# Patient Record
Sex: MALE | Race: WHITE | NOT HISPANIC OR LATINO | Employment: OTHER | URBAN - METROPOLITAN AREA
[De-identification: names, ages, dates, MRNs, and addresses within clinical notes are randomized per-mention and may not be internally consistent; named-entity substitution may affect disease eponyms.]

---

## 2022-07-06 PROBLEM — E03.9 HYPOTHYROIDISM (ACQUIRED): Status: ACTIVE | Noted: 2022-07-06

## 2022-07-06 PROBLEM — I10 ESSENTIAL HYPERTENSION: Status: ACTIVE | Noted: 2022-07-06

## 2022-07-06 PROBLEM — I48.91 A-FIB: Status: ACTIVE | Noted: 2022-07-06

## 2022-07-06 PROBLEM — G47.30 SLEEP APNEA: Status: ACTIVE | Noted: 2022-07-06

## 2022-07-06 NOTE — PROGRESS NOTES
OFFICE VISIT  NOTE  [facility] CARDIOLOGY      Name: Darrell Lopez    Date: 2022  MRN:  [id number]  :  1950      REFERRING/PRIMARY PROVIDER:  Rachel Duarte MD    Chief Complaint   Patient presents with   • Atrial Fibrillation       HPI: Darrell Lopez is a 71 y.o. male who presents today for new consultation for A.Fib.  Diagnosed with A. fib approximately 15 years ago in [place], recently moved to [place] to be closer to grandchildren.  Tried 2-3 antiarrhythmics prior but none could control his A. fib therefore rate control pursued.  He is on diltiazem and Xarelto.  No bleeding complications.  History of hypertension and hypothyroidism.  Exercises 2-3 days/week by walking on treadmill, no chest pain or shortness of breath.  Last echo over 5 to 10 years ago, last stress test many years ago.    Past Medical History:   Diagnosis Date   • Abnormal heart rhythm    • Acid reflux    • Easy bruising    • Gout    • Thyroid disorder        Past Surgical History:   Procedure Laterality Date   • FOOT SURGERY Left    • KNEE CARTILAGE SURGERY     • TENDON REPAIR         Social History     Socioeconomic History   • Marital status: Unknown   Tobacco Use   • Smoking status: Never Smoker   • Smokeless tobacco: Never Used   Vaping Use   • Vaping Use: Never used   Substance and Sexual Activity   • Alcohol use: Yes     Comment: 1-2 week   • Drug use: Never   • Sexual activity: Defer       Family History   Problem Relation Age of Onset   • Heart attack Mother    • Stroke Father    • No Known Problems Sister    • No Known Problems Sister    • No Known Problems Brother    • No Known Problems Brother         ROS:   Constitutional no fever,  no weight loss   Skin no rash, no subcutaneous nodules   Otolaryngeal no difficulty swallowing   Cardiovascular See HPI   Pulmonary no cough, no sputum production   Gastrointestinal no constipation, no diarrhea   Genitourinary no dysuria, no hematuria   Hematologic no  "easy bruisability, no abnormal bleeding   Musculoskeletal no muscle pain   Neurologic no dizziness, no falls         No Known Allergies      Current Outpatient Medications:   •  allopurinol (ZYLOPRIM) 300 MG tablet, Take 1 tablet by mouth Daily., Disp: , Rfl:   •  dilTIAZem (TIAZAC) 240 MG 24 hr capsule, Take 1 capsule by mouth Daily., Disp: , Rfl:   •  famotidine (PEPCID) 40 MG tablet, Take 1 tablet by mouth Daily., Disp: , Rfl:   •  ibandronate (BONIVA) 150 MG tablet, Take 1 tablet by mouth Every 30 (Thirty) Days., Disp: , Rfl:   •  levothyroxine sodium (TIROSINT) 50 MCG capsule, Take 1 capsule by mouth Daily., Disp: , Rfl:   •  rivaroxaban (XARELTO) 20 MG tablet, Take 1 tablet by mouth Daily., Disp: , Rfl:     Vitals:    07/07/22 1038   BP: 132/82   BP Location: Right arm   Patient Position: Sitting   Pulse: 66   SpO2: 98%   Weight: 92.5 kg (204 lb)   Height: 180.3 cm (71\")     Body mass index is 28.45 kg/m².    PHYSICAL EXAM:    General Appearance:   · well developed  · well nourished  HENT:   · oropharynx moist  · lips not cyanotic  Neck:  · thyroid not enlarged  · supple  Respiratory:  · no respiratory distress  · normal breath sounds  · no rales  Cardiovascular:  · no jugular venous distention  · regular rhythm  · apical impulse normal  · S1 normal, S2 normal  · no S3, no S4   · no murmur  · no rub, no thrill  · carotid pulses normal; no bruit  · lower extremity edema: none      Musculoskeletal:  · no clubbing of fingers.   · normocephalic, head atraumatic  Skin:   · warm, dry  Psychiatric:  · judgement and insight appropriate  · normal mood and affect    RESULTS:     ECG 12 Lead    Date/Time: 7/7/2022 11:05 AM  Performed by: Ilya Seth MD  Authorized by: Ilya Seth MD   Comparison: not compared with previous ECG   Previous ECG: no previous ECG available  Rhythm: atrial fibrillation  Rate: normal  BPM: 61  QRS axis: normal    Clinical impression: non-specific ECG                  Labs:  CBC " 5/23/22   RBC 4.77   WBC 6.6   Hemoglobin 14.8   Hematocrit 42.8   Platelet 209       CMP 5/23/22   Glucose 95   BUN 12   Creatinine 0.95   Glomerular Filtration Rate >60   BUN/Creatinine Ratio    Sodium 142   Potassium 4.5   Chloride 104   Carbon Dioxide 30   Calcium 10.2   AST 25   ALT 19       LIPID PANEL 5/23/22   Total 164   HDL 49.7   Triglycerides 75   LDL 99       PSA 2.55   TSH 1.36       Most recent PCP note, imaging tests, and labs reviewed.  Personally reviewed his most recent PCP note, and labs including thyroid function, lipids, and CMP.  Also personally viewed and interpreted his previous EKG which showed controlled rate A. fib no ischemic changes.    ASSESSMENT:  Problem List Items Addressed This Visit        Cardiac and Vasculature    A-fib (HCC) - Primary    Overview     Awaiting records from Dr. Greg Garland           Relevant Medications    dilTIAZem (TIAZAC) 240 MG 24 hr capsule    Essential hypertension    Relevant Medications    dilTIAZem (TIAZAC) 240 MG 24 hr capsule       Endocrine and Metabolic    Hypothyroidism (acquired)    Relevant Medications    levothyroxine sodium (TIROSINT) 50 MCG capsule       Sleep    Sleep apnea          PLAN:    1.  Permanent atrial fibrillation:  Continue rate control strategy with diltiazem  Continue stroke prevention with Xarelto at current dose  Continue exercise, increase 250 minutes/week.    2.  Dyspnea on exertion:  Check echocardiogram    3.  Hyperlipidemia:  Goal LDL less than 100, continue diet control for now.    4.  Hypertension:  Long-term goal blood pressure less than 130/80  Continue diltiazem at current dose    Advance Care Planning   ACP discussion was held with the patient during this visit. Patient does not have an advance directive, information provided.         Return to clinic in 9 months, or sooner as needed.    Thank you for the opportunity to share in the care of your patient; please do not hesitate to call me with any questions.      Ilya Seth MD, [facility]  Office: [phone]  [place] [address]    07/07/22

## 2022-07-07 ENCOUNTER — OFFICE VISIT (OUTPATIENT)
Dept: CARDIOLOGY | Facility: CLINIC | Age: 72
End: 2022-07-07

## 2022-07-07 VITALS
DIASTOLIC BLOOD PRESSURE: 82 MMHG | HEIGHT: 71 IN | BODY MASS INDEX: 28.56 KG/M2 | WEIGHT: 204 LBS | HEART RATE: 66 BPM | OXYGEN SATURATION: 98 % | SYSTOLIC BLOOD PRESSURE: 132 MMHG

## 2022-07-07 DIAGNOSIS — E03.9 HYPOTHYROIDISM (ACQUIRED): ICD-10-CM

## 2022-07-07 DIAGNOSIS — I10 ESSENTIAL HYPERTENSION: ICD-10-CM

## 2022-07-07 DIAGNOSIS — G47.30 SLEEP APNEA, UNSPECIFIED TYPE: ICD-10-CM

## 2022-07-07 DIAGNOSIS — I48.0 PAROXYSMAL ATRIAL FIBRILLATION: Primary | ICD-10-CM

## 2022-07-07 PROCEDURE — 99204 OFFICE O/P NEW MOD 45 MIN: CPT | Performed by: INTERNAL MEDICINE

## 2022-07-07 PROCEDURE — 93000 ELECTROCARDIOGRAM COMPLETE: CPT | Performed by: INTERNAL MEDICINE

## 2022-07-07 RX ORDER — DILTIAZEM HYDROCHLORIDE 240 MG/1
1 CAPSULE, EXTENDED RELEASE ORAL DAILY
COMMUNITY

## 2022-07-07 RX ORDER — ALLOPURINOL 300 MG/1
1 TABLET ORAL DAILY
COMMUNITY

## 2022-07-07 RX ORDER — IBANDRONATE SODIUM 150 MG/1
1 TABLET, FILM COATED ORAL
COMMUNITY

## 2022-07-07 RX ORDER — FAMOTIDINE 40 MG/1
1 TABLET, FILM COATED ORAL DAILY
COMMUNITY

## 2022-07-07 RX ORDER — LEVOTHYROXINE SODIUM 50 UG/1
1 CAPSULE ORAL DAILY
COMMUNITY

## 2022-07-18 ENCOUNTER — HOSPITAL ENCOUNTER (OUTPATIENT)
Dept: CARDIOLOGY | Facility: HOSPITAL | Age: 72
Setting detail: HOSPITAL OUTPATIENT SURGERY
Discharge: HOME | End: 2022-07-18
Admitting: INTERNAL MEDICINE

## 2022-07-18 VITALS — BODY MASS INDEX: 28.4 KG/M2 | HEIGHT: 71 IN | WEIGHT: 202.82 LBS

## 2022-07-18 DIAGNOSIS — I48.0 PAROXYSMAL ATRIAL FIBRILLATION: ICD-10-CM

## 2022-07-18 LAB
ASCENDING AORTA: 3.3 CM
BH CV ECHO MEAS - AO MAX PG: 4.8 MMHG
BH CV ECHO MEAS - AO MEAN PG: 3 MMHG
BH CV ECHO MEAS - AO ROOT DIAM: 3.1 CM
BH CV ECHO MEAS - AO V2 MAX: 109 CM/SEC
BH CV ECHO MEAS - AO V2 VTI: 22 CM
BH CV ECHO MEAS - AVA(I,D): 3.7 CM2
BH CV ECHO MEAS - EDV(CUBED): 220.3 ML
BH CV ECHO MEAS - EDV(MOD-SP2): 87 ML
BH CV ECHO MEAS - EDV(MOD-SP4): 73 ML
BH CV ECHO MEAS - EF(MOD-BP): 61 %
BH CV ECHO MEAS - EF(MOD-SP2): 66.7 %
BH CV ECHO MEAS - EF(MOD-SP4): 57.5 %
BH CV ECHO MEAS - ESV(CUBED): 65.9 ML
BH CV ECHO MEAS - ESV(MOD-SP2): 29 ML
BH CV ECHO MEAS - ESV(MOD-SP4): 31 ML
BH CV ECHO MEAS - FS: 33.1 %
BH CV ECHO MEAS - IVS/LVPW: 0.95 CM
BH CV ECHO MEAS - IVSD: 1.23 CM
BH CV ECHO MEAS - LA DIMENSION: 5.4 CM
BH CV ECHO MEAS - LAT PEAK E' VEL: 16.4 CM/SEC
BH CV ECHO MEAS - LV MASS(C)D: 340.9 GRAMS
BH CV ECHO MEAS - LV MAX PG: 4.4 MMHG
BH CV ECHO MEAS - LV MEAN PG: 2 MMHG
BH CV ECHO MEAS - LV V1 MAX: 105 CM/SEC
BH CV ECHO MEAS - LV V1 VTI: 21.6 CM
BH CV ECHO MEAS - LVIDD: 6 CM
BH CV ECHO MEAS - LVIDS: 4 CM
BH CV ECHO MEAS - LVOT AREA: 3.8 CM2
BH CV ECHO MEAS - LVOT DIAM: 2.2 CM
BH CV ECHO MEAS - LVPWD: 1.3 CM
BH CV ECHO MEAS - MED PEAK E' VEL: 8.8 CM/SEC
BH CV ECHO MEAS - MV DEC SLOPE: 439 CM/SEC2
BH CV ECHO MEAS - MV DEC TIME: 0.2 MSEC
BH CV ECHO MEAS - MV E MAX VEL: 72.6 CM/SEC
BH CV ECHO MEAS - MV P1/2T: 79.4 MSEC
BH CV ECHO MEAS - MVA(P1/2T): 2.8 CM2
BH CV ECHO MEAS - PA ACC SLOPE: 690 CM/SEC2
BH CV ECHO MEAS - PA ACC TIME: 0.12 SEC
BH CV ECHO MEAS - PA PR(ACCEL): 25 MMHG
BH CV ECHO MEAS - PI END-D VEL: 119 CM/SEC
BH CV ECHO MEAS - RAP SYSTOLE: 3 MMHG
BH CV ECHO MEAS - RVSP: 30 MMHG
BH CV ECHO MEAS - SV(LVOT): 82.1 ML
BH CV ECHO MEAS - SV(MOD-SP2): 58 ML
BH CV ECHO MEAS - SV(MOD-SP4): 42 ML
BH CV ECHO MEAS - TAPSE (>1.6): 2.5 CM
BH CV ECHO MEAS - TR MAX PG: 27.9 MMHG
BH CV ECHO MEAS - TR MAX VEL: 264 CM/SEC
BH CV ECHO MEASUREMENTS AVERAGE E/E' RATIO: 5.76
BH CV VAS BP RIGHT ARM: NORMAL MMHG
BH CV XLRA - RV BASE: 3 CM
BH CV XLRA - RV LENGTH: 6.3 CM
BH CV XLRA - RV MID: 3.1 CM
BH CV XLRA - TDI S': 8.9 CM/SEC
IVRT: 52 MSEC
LEFT ATRIUM VOLUME INDEX: 52.6 ML/M2
MAXIMAL PREDICTED HEART RATE: 147 BPM
STRESS TARGET HR: 125 BPM

## 2022-07-18 PROCEDURE — 93306 TTE W/DOPPLER COMPLETE: CPT

## 2022-07-18 PROCEDURE — 93306 TTE W/DOPPLER COMPLETE: CPT | Performed by: INTERNAL MEDICINE

## 2022-07-19 ENCOUNTER — TELEPHONE (OUTPATIENT)
Dept: CARDIOLOGY | Facility: CLINIC | Age: 72
End: 2022-07-19

## 2022-07-19 NOTE — TELEPHONE ENCOUNTER
----- Message from Ilya Seth MD sent at 7/19/2022  8:23 AM EDT -----  Please inform the patient of their test results. Thank you.

## 2023-03-31 NOTE — PROGRESS NOTES
OFFICE VISIT  NOTE  [facility] CARDIOLOGY MAIN CAMPUS      Name: Darrell Lopez    Date: 2023  MRN:  [id number]  :  1950      REFERRING/PRIMARY PROVIDER:  Rachel Duarte MD     Chief Complaint   Patient presents with   • Atrial Fibrillation   • Hypertension       HPI: Darrell Lopez is a 72 y.o. male who presents today follow up for A.Fib.  Diagnosed with A. fib approximately 15 years ago in [place], recently moved to [place] to be closer to grandchildren.  Tried 2-3 antiarrhythmics prior but none could control his A. fib therefore rate control pursued.  He is on diltiazem and Xarelto.  No bleeding complications.  History of hypertension and hypothyroidism.    Echo 2022 with normal EF, mild LVH, mild-mod MR, severe left atrial enlargement, normal RVSP.  Doing well, denies chest pain palpitations or significant shortness of breath, compliant with CPAP.  Working in his garden on the warmer days, not exercising currently.    ROS:Pertinent positives as listed in the HPI.  All other systems reviewed and negative.    Past Medical History:   Diagnosis Date   • Abnormal heart rhythm    • Acid reflux    • Easy bruising    • Gout    • Thyroid disorder        Past Surgical History:   Procedure Laterality Date   • FOOT SURGERY Left    • KNEE CARTILAGE SURGERY     • TENDON REPAIR         Social History     Socioeconomic History   • Marital status:    Tobacco Use   • Smoking status: Never   • Smokeless tobacco: Never   Vaping Use   • Vaping Use: Never used   Substance and Sexual Activity   • Alcohol use: Yes     Comment: 1-2 week   • Drug use: Never   • Sexual activity: Defer       Family History   Problem Relation Age of Onset   • Heart attack Mother    • Stroke Father    • No Known Problems Sister    • No Known Problems Sister    • No Known Problems Brother    • No Known Problems Brother         No Known Allergies    Current Outpatient Medications   Medication  "Instructions   • allopurinol (ZYLOPRIM) 300 MG tablet 1 tablet, Oral, Daily   • clobetasol (TEMOVATE) 0.05 % ointment No dose, route, or frequency recorded.   • dilTIAZem (TIAZAC) 240 MG 24 hr capsule 1 capsule, Oral, Daily   • famotidine (PEPCID) 40 MG tablet 1 tablet, Oral, Daily   • ibandronate (BONIVA) 150 MG tablet 1 tablet, Oral, Every 30 Days   • levothyroxine sodium (TIROSINT) 50 MCG capsule 1 capsule, Oral, Daily   • rivaroxaban (XARELTO) 20 MG tablet 1 tablet, Oral, Daily       Vitals:    04/13/23 1021   BP: 122/80   BP Location: Right arm   Patient Position: Sitting   Pulse: 55   SpO2: 99%   Weight: 90.3 kg (199 lb)   Height: 180 cm (70.87\")     Body mass index is 27.86 kg/m².    PHYSICAL EXAM:    General Appearance:   · well developed  · well nourished  Neck:  · thyroid not enlarged  · supple  Respiratory:  · no respiratory distress  · normal breath sounds  · no rales  Cardiovascular:  · no jugular venous distention  · regular rhythm  · apical impulse normal  · S1 normal, S2 normal  · no S3, no S4   · no murmur  · no rub, no thrill  · carotid pulses normal; no bruit  · pedal pulses normal  · lower extremity edema: none    Skin:   warm, dry    RESULTS:   Procedures    Results for orders placed during the hospital encounter of 07/18/22    Adult Transthoracic Echo Complete W/ Cont if Necessary Per Protocol    Interpretation Summary  · Left ventricular ejection fraction appears to be 61 - 65%. Left ventricular systolic function is normal.  · Left ventricular wall thickness is consistent with mild concentric hypertrophy.  · Left atrial volume is severely increased.  · The right atrial cavity is moderately dilated.  · Estimated right ventricular systolic pressure from tricuspid regurgitation is normal (<35 mmHg). Calculated right ventricular systolic pressure from tricuspid regurgitation is 30 mmHg.  · Left ventricular diastolic function was normal.  · Mild to moderate mitral regurgitation.        Labs:  No " results found for: CHOL, TRIG, HDL, LDL, AST, ALT  No results found for: HGBA1C  No results found for: CREATININE  No results found for: EGFRIFNONA      ASSESSMENT:  Problem List Items Addressed This Visit        Cardiac and Vasculature    A-fib - Primary    Overview     Awaiting records from Dr. Greg Garalnd         Essential hypertension       Sleep    Sleep apnea       PLAN:  1.  Permanent atrial fibrillation:  Continue rate control strategy with diltiazem  Continue stroke prevention with Xarelto at current dose, no current bleeding complications.    Resume aerobic exercise of 15 to 30 minutes/day     2.  Dyspnea on exertion:  Echo 7/2022 with normal EF, mild LVH, mild-mod MR  Symptoms largely resolved, advised him to increase his aerobic exercise     3.  Hyperlipidemia:  Goal LDL less than 100, continue diet control for now.  Labs monitored by PCP     4.  Hypertension:  Long-term goal blood pressure less than 130/80  Well-controlled on current regimen, continue, low-sodium diet and exercise         Advance Care Planning   ACP discussion was held with the patient during this visit. Patient does not have an advance directive, information provided.          Follow-up   Return in about 1 year (around 4/13/2024).  Ilya Seth MD, FACC, [facility]  Interventional Cardiology

## 2023-04-13 ENCOUNTER — OFFICE VISIT (OUTPATIENT)
Dept: CARDIOLOGY | Facility: CLINIC | Age: 73
End: 2023-04-13
Payer: MEDICARE

## 2023-04-13 VITALS
HEART RATE: 55 BPM | HEIGHT: 71 IN | WEIGHT: 199 LBS | OXYGEN SATURATION: 99 % | BODY MASS INDEX: 27.86 KG/M2 | DIASTOLIC BLOOD PRESSURE: 80 MMHG | SYSTOLIC BLOOD PRESSURE: 122 MMHG

## 2023-04-13 DIAGNOSIS — I48.11 LONGSTANDING PERSISTENT ATRIAL FIBRILLATION: Primary | ICD-10-CM

## 2023-04-13 DIAGNOSIS — G47.30 SLEEP APNEA, UNSPECIFIED TYPE: ICD-10-CM

## 2023-04-13 DIAGNOSIS — I10 ESSENTIAL HYPERTENSION: ICD-10-CM

## 2023-04-13 RX ORDER — CLOBETASOL PROPIONATE 0.5 MG/G
OINTMENT TOPICAL
COMMUNITY
Start: 2023-02-02

## 2023-05-24 ENCOUNTER — TRANSCRIBE ORDERS (OUTPATIENT)
Dept: ADMINISTRATIVE | Facility: HOSPITAL | Age: 73
End: 2023-05-24
Payer: MEDICARE

## 2023-05-24 ENCOUNTER — HOSPITAL ENCOUNTER (OUTPATIENT)
Dept: GENERAL RADIOLOGY | Facility: HOSPITAL | Age: 73
Discharge: HOME | End: 2023-05-24
Admitting: INTERNAL MEDICINE
Payer: MEDICARE

## 2023-05-24 DIAGNOSIS — M25.511 RIGHT SHOULDER PAIN, UNSPECIFIED CHRONICITY: Primary | ICD-10-CM

## 2023-05-24 DIAGNOSIS — M25.511 RIGHT SHOULDER PAIN, UNSPECIFIED CHRONICITY: ICD-10-CM

## 2023-05-24 PROCEDURE — 73030 X-RAY EXAM OF SHOULDER: CPT

## 2023-11-03 ENCOUNTER — TELEPHONE (OUTPATIENT)
Dept: CARDIOLOGY | Facility: CLINIC | Age: 73
End: 2023-11-03
Payer: MEDICARE

## 2023-11-03 NOTE — TELEPHONE ENCOUNTER
Dr. Chirag Stack at [facility] P [phone] ext. [phone] requests CC and permission to hold Xarelto for upcoming right shoulder scope, debridement on 12/1.     Last seen 4/13/23 PAF, HORTA, echo 7/2022 normal EF, mild LVH, mild-mod MR, symptoms were largely resolved at this visit.     If okay to proceed will fax CC letter to Gabriela Mclean at F: [phone]

## 2025-01-16 ENCOUNTER — TRANSCRIBE ORDERS (OUTPATIENT)
Dept: PHYSICAL THERAPY | Facility: HOSPITAL | Age: 75
End: 2025-01-16
Payer: MEDICARE

## 2025-01-16 DIAGNOSIS — I89.0 LYMPHEDEMA: Primary | ICD-10-CM
